# Patient Record
Sex: FEMALE | URBAN - METROPOLITAN AREA
[De-identification: names, ages, dates, MRNs, and addresses within clinical notes are randomized per-mention and may not be internally consistent; named-entity substitution may affect disease eponyms.]

---

## 2021-05-06 ENCOUNTER — OFFICE VISIT (OUTPATIENT)
Dept: URBAN - METROPOLITAN AREA CLINIC 56 | Facility: CLINIC | Age: 61
End: 2021-05-06
Payer: COMMERCIAL

## 2021-05-06 DIAGNOSIS — H52.223 REGULAR ASTIGMATISM, BILATERAL: ICD-10-CM

## 2021-05-06 DIAGNOSIS — H25.13 AGE-RELATED NUCLEAR CATARACT, BILATERAL: ICD-10-CM

## 2021-05-06 DIAGNOSIS — H52.4 PRESBYOPIA: Primary | ICD-10-CM

## 2021-05-06 PROCEDURE — 92004 COMPRE OPH EXAM NEW PT 1/>: CPT | Performed by: OPTOMETRIST

## 2021-05-06 ASSESSMENT — INTRAOCULAR PRESSURE
OD: 14
OS: 14

## 2021-05-06 ASSESSMENT — KERATOMETRY
OS: 43.87
OD: 44.49

## 2021-05-06 ASSESSMENT — VISUAL ACUITY
OD: 20/20
OS: 20/20

## 2021-05-06 NOTE — IMPRESSION/PLAN
Impression: Regular astigmatism, bilateral: H52.223. Plan: Undercorrected astigmatism explains the patient's presenting chief concern and recommend new glasses.

## 2021-05-06 NOTE — IMPRESSION/PLAN
Impression: Presbyopia: H52.4. Plan: Recommended glasses for optimal vision. New glasses prescription given to patient today.

## 2022-06-02 ENCOUNTER — OFFICE VISIT (OUTPATIENT)
Dept: URBAN - METROPOLITAN AREA CLINIC 56 | Facility: CLINIC | Age: 62
End: 2022-06-02
Payer: COMMERCIAL

## 2022-06-02 DIAGNOSIS — H43.811 VITREOUS DEGENERATION, RIGHT EYE: ICD-10-CM

## 2022-06-02 DIAGNOSIS — H25.13 AGE-RELATED NUCLEAR CATARACT, BILATERAL: ICD-10-CM

## 2022-06-02 DIAGNOSIS — H52.4 PRESBYOPIA: Primary | ICD-10-CM

## 2022-06-02 PROCEDURE — 92014 COMPRE OPH EXAM EST PT 1/>: CPT | Performed by: STUDENT IN AN ORGANIZED HEALTH CARE EDUCATION/TRAINING PROGRAM

## 2022-06-02 ASSESSMENT — INTRAOCULAR PRESSURE
OS: 15
OD: 15

## 2022-06-02 ASSESSMENT — KERATOMETRY
OD: 44.47
OS: 43.75

## 2022-06-02 ASSESSMENT — VISUAL ACUITY
OD: 20/20
OS: 20/20

## 2022-06-02 NOTE — IMPRESSION/PLAN
Impression: Age-related nuclear cataract, bilateral: H25.13. Plan: not visually significant. monitor. Call if vision worsens.
Impression: Presbyopia: H52.4. Plan: updated spec Rx w/+2.50 add.
Impression: Vitreous degeneration, right eye: H43.811. Plan: no holes/tears/detachments. RD precautions discussed. Monitor.
07-Dec-2021 16:33